# Patient Record
Sex: MALE
[De-identification: names, ages, dates, MRNs, and addresses within clinical notes are randomized per-mention and may not be internally consistent; named-entity substitution may affect disease eponyms.]

---

## 2018-01-25 ENCOUNTER — HOSPITAL ENCOUNTER (EMERGENCY)
Dept: HOSPITAL 14 - H.ER | Age: 43
Discharge: HOME | End: 2018-01-25
Payer: MEDICAID

## 2018-01-25 VITALS
RESPIRATION RATE: 17 BRPM | TEMPERATURE: 98.9 F | HEART RATE: 89 BPM | SYSTOLIC BLOOD PRESSURE: 122 MMHG | OXYGEN SATURATION: 99 % | DIASTOLIC BLOOD PRESSURE: 72 MMHG

## 2018-01-25 DIAGNOSIS — I10: ICD-10-CM
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
## 2018-01-25 DIAGNOSIS — J45.909: ICD-10-CM

## 2018-01-25 DIAGNOSIS — S43.401A: ICD-10-CM
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
## 2018-01-25 DIAGNOSIS — N43.3: ICD-10-CM

## 2018-01-25 DIAGNOSIS — C62.90: ICD-10-CM

## 2018-01-25 LAB
BASOPHILS # BLD AUTO: 0.1 K/UL (ref 0–0.2)
BASOPHILS NFR BLD: 0.9 % (ref 0–2)
BUN SERPL-MCNC: 16 MG/DL (ref 9–20)
CALCIUM SERPL-MCNC: 9.4 MG/DL (ref 8.4–10.2)
EOSINOPHIL # BLD AUTO: 0.1 K/UL (ref 0–0.7)
EOSINOPHIL NFR BLD: 1.7 % (ref 0–4)
ERYTHROCYTE [DISTWIDTH] IN BLOOD BY AUTOMATED COUNT: 15.2 % (ref 11.5–14.5)
GFR NON-AFRICAN AMERICAN: > 60
HGB BLD-MCNC: 11.9 G/DL (ref 12–18)
LYMPHOCYTES # BLD AUTO: 2 K/UL (ref 1–4.3)
LYMPHOCYTES NFR BLD AUTO: 28.7 % (ref 20–40)
MCH RBC QN AUTO: 28.6 PG (ref 27–31)
MCHC RBC AUTO-ENTMCNC: 32.7 G/DL (ref 33–37)
MCV RBC AUTO: 87.6 FL (ref 80–94)
MONOCYTES # BLD: 0.7 K/UL (ref 0–0.8)
MONOCYTES NFR BLD: 9.6 % (ref 0–10)
NEUTROPHILS # BLD: 4.1 K/UL (ref 1.8–7)
NEUTROPHILS NFR BLD AUTO: 59.1 % (ref 50–75)
NRBC BLD AUTO-RTO: 0.1 % (ref 0–0)
PLATELET # BLD: 233 K/UL (ref 130–400)
PMV BLD AUTO: 6.7 FL (ref 7.2–11.7)
RBC # BLD AUTO: 4.15 MIL/UL (ref 4.4–5.9)
WBC # BLD AUTO: 7 K/UL (ref 4.8–10.8)

## 2018-01-25 NOTE — CT
EXAM:

  CT Abdomen and Pelvis Without Intravenous Contrast



CLINICAL HISTORY:

  42 years old, male; Signs and symptoms; Mass, lump, or swelling; Other: 

Testicular mass; Additional info: Left testicular mass



TECHNIQUE:

  Axial computed tomography images of the abdomen and pelvis without 

intravenous contrast.  All CT scans at this facility use one or more dose 

reduction techniques, viz.: automated exposure control; ma/kV adjustment per 

patient size (including targeted exams where dose is matched to indication; 

i.e. head); or iterative reconstruction technique.  661 images are submitted.

  Coronal and sagittal reformatted images were created and reviewed.



COMPARISON:

  No relevant prior studies available.



FINDINGS:

  Artifacts:  Limited due to motion and misregistration artifacts.

  Lower thorax:  Bibasilar nonspecific infiltrates are present, consistent with 

atelectasis or pneumonia.  



 ABDOMEN:Limitations: Absence of IV contrast decreases sensitivity for 

detecting vascular and visceral injury and abnormality.

  Liver:  Unremarkable.

  Gallbladder and bile ducts:  Contracted gallbladder.

  Pancreas:  Unremarkable.  No ductal dilation.

  Spleen:  Unremarkable.  No splenomegaly.

  Adrenals:  Unremarkable.  No mass.

  Kidneys and ureters:  Unremarkable.  No obstructing stones.  No 

hydronephrosis.

  Stomach and bowel:  Large amount of stool in the colon.  Correlation with 

patient's clinical history of constipation  is recommended.  No mucosal 

thickening.

  Appendix:  Normal appendix.



 PELVIS:

  Bladder:  Unremarkable.

  Reproductive:  Possible hydrocele.  Prostate gland is seen.  The testicles 

are not well evaluated on this examination.



 ABDOMEN and PELVIS:

  Intraperitoneal space:  Unremarkable.  No free air.  No significant fluid 

collection.

  Bones/joints:  No acute fracture.  No dislocation.

  Soft tissues:  Unremarkable.

  Vasculature:  Unremarkable.  No abdominal aortic aneurysm.

  Lymph nodes: Unremarkable.



IMPRESSION:     

1.No acute abnormality on this  noncontrast CT examination of the abdomen and 

pelvis .

2.  Clinical history of left testicular mass.  Possible hydrocele.  The 

testicles are not evaluated on the CT scan.If clinically warranted, a testicle 

ultrasound may be helpful for further assessment.
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
## 2018-01-25 NOTE — RAD
PROCEDURE:  Radiographs of the Right Shoulder



HISTORY:

Posttraumatic right shoulder pain 







COMPARISON:

No prior.



FINDINGS:



BONES:

Normal. No fracture.



JOINTS:

Normal. Glenohumeral and acromioclavicular joints preserved. No 

osteoarthritis.



SOFT TISSUES:

Normal.



OTHER FINDINGS:

None.



IMPRESSION:

Normal radiographs of the right shoulder.



___________________________________________________________



Concordant results with the preliminary interpretation rendered by 

the emergency department physician

procedure.

## 2018-07-07 ENCOUNTER — HOSPITAL ENCOUNTER (EMERGENCY)
Dept: HOSPITAL 31 - C.ER | Age: 43
Discharge: LEFT BEFORE DISCHARGE | End: 2018-07-07
Payer: SELF-PAY

## 2018-07-07 VITALS
SYSTOLIC BLOOD PRESSURE: 126 MMHG | OXYGEN SATURATION: 97 % | HEART RATE: 103 BPM | TEMPERATURE: 97.6 F | DIASTOLIC BLOOD PRESSURE: 79 MMHG | RESPIRATION RATE: 20 BRPM

## 2018-07-07 DIAGNOSIS — F19.10: ICD-10-CM
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
## 2018-07-07 DIAGNOSIS — T50.991A: Primary | ICD-10-CM

## 2018-07-07 NOTE — C.PDOC
History Of Present Illness


42 year old male is brought to the ED by EMS for evaluation. Patient reports 

intermittent use of IV heroin, started using again this month. Patient reports 

he has been using heroin throughout his adult life. Patient admits to injecting 

" 2 bags of heroin: today. Patient was found by EMS unresponsive, diaphoretic 

and with urinary incontinence. Patient received 2 doses of narcan last one 

approximately 15 minutes PTA. Upon arrival patient is AOx3 voices no acute 

complaints. Patient agrees to be observed for 30 minutes until narcan has worn 

off but decline any type of blood work. 


Chief Complaint (Nursing): Substance Abuse


History Per: Patient, EMS


History/Exam Limitations: intoxication


Onset/Duration Of Symptoms: Hrs


Current Symptoms Are (Timing): Still Present


Suicide/Self Injury Attempted (Context): Ingestion


Modifying Factor(s): Other (Heroin)


Associated Symptoms: denies: Depression, Suicidal Thoughts, Suicidal Plan


Recent travel outside of the [place]: No


Additional History Per: Patient, EMS





Past Medical History


Reviewed: Historical Data, Nursing Documentation, Vital Signs


Vital Signs: 


 Last Vital Signs











Temp  97.6 F   18 21:18


 


Pulse  103 H  18 21:18


 


Resp  20   18 21:18


 


BP  126/79   18 21:18


 


Pulse Ox  97   18 22:09














- Medical History


PMH: Asthma, HTN


   Denies: Diabetes, Hepatitis, HIV, Seizures, Sexually Transmitted Disease


Surgical History: No Surg Hx


Family History: States: Unknown Family Hx





- Social History


Hx Alcohol Use: Yes


Hx Substance Use: Yes (heroine)





- Immunization History


Hx Tetanus Toxoid Vaccination: Yes


Hx Influenza Vaccination: Yes


Hx Pneumococcal Vaccination: Yes





Review Of Systems


Constitutional: Negative for: Fever, Chills


Cardiovascular: Negative for: Chest Pain, Palpitations


Respiratory: Negative for: Cough, Shortness of Breath


Gastrointestinal: Negative for: Nausea, Vomiting


Skin: Negative for: Rash


Psych: Negative for: Depression, Suicidal ideation





Physical Exam





- Physical Exam


Appears: Non-toxic, Other (poor hygiene, urine soaked pants. defensive, 

argumentative)


Skin: Normal Color, Warm, Dry, Diaphoretic, Other (fresh needle tracks dorsum 

on right hand)


Head: Atraumatic, Normacephalic


Eye(s): bilateral: Normal Inspection


Oral Mucosa: Moist


Neck: Normal ROM, Supple


Chest: Symmetrical


Cardiovascular: Rhythm Regular


Respiratory: Normal Breath Sounds, No Rales, No Rhonchi, No Wheezing


Gastrointestinal/Abdominal: Soft, No Tenderness, No Guarding, No Rebound


Extremity: Normal ROM, No Tenderness, No Swelling


Neurological/Psych: Oriented x3, Normal Speech, Other (non focal)


Gait: Steady





ED Course And Treatment


ECG: Interpreted By Me, Viewed By Me


ECG Rhythm: Sinus Rhythm


Interpretation Of ECG: Left atrial enlargement, right bundeloid apparent in QRS


Rate From EC (BPM)


O2 Sat by Pulse Oximetry: 97 (ON RA)


Pulse Ox Interpretation: Normal





Against Medical Advice





- AMA


Patient Left Against Medical Advice: 


The patient declines admission to the hospital and wishes to leave the 

Emergency Department.  This action is against my medical advice. This decision 

was made with informed refusal. The patient was told that admission to the 

hospital is necessary.  Explanation of the reasons why were discussed.  


The risks of leaving were explained to the patient and include, but are not 

limited to, worsening of known or currently unknown conditions, permanent 

disability and death from undiagnosed or untreated conditions. 


The patient has the capacity to make this informed decision and understands my 

explanation of the current medical problem and risks of leaving. 


The patient voluntarily accepts these risks and signed an AMA form documenting 

our conversation.


The patient was given the opportunity to ask questions and reconsider.  The 

patient was encouraged to return to the Emergency Department at any time for 

further care.








Medical Decision Making


Medical Decision Making: 


Patient agrees to be observed for 30 minutes until narcan wear off but declines 

any type of blood work. 





22:08 - Patient was observed for 40 minutes did not go back to narcotic states, 

will be allowed to sign out AMA. 





Disposition





- Disposition


Referrals: 


[facility] [Outside]


Disposition: AGAINST MEDICAL ADVICE


Disposition Time: 22:20


Condition: FAIR


Instructions:  Narcotic Overdose 


Forms:  [facility] (English)


Print Language: ENGLISH





- Clinical Impression


Clinical Impression: 


 Drug abuse, Accidental drug overdose








- Scribe Statement


The provider has reviewed the documentation as recorded by the Scribe


Alex Alatorre





All medical record entries made by the Scribe were at my direction and 

personally dictated by me. I have reviewed the chart and agree that the record 

accurately reflects my personal performance of the history, physical exam, 

medical decision making, and the department course for this patient. I have 

also personally directed, reviewed, and agree with the discharge instructions 

and disposition.

## 2018-07-21 ENCOUNTER — HOSPITAL ENCOUNTER (EMERGENCY)
Dept: HOSPITAL 31 - C.ER | Age: 43
Discharge: LEFT BEFORE DISCHARGE | End: 2018-07-21
Payer: SELF-PAY

## 2018-07-21 VITALS
HEART RATE: 85 BPM | RESPIRATION RATE: 22 BRPM | OXYGEN SATURATION: 97 % | SYSTOLIC BLOOD PRESSURE: 145 MMHG | DIASTOLIC BLOOD PRESSURE: 90 MMHG | TEMPERATURE: 98.2 F

## 2018-07-21 DIAGNOSIS — R11.2: Primary | ICD-10-CM

## 2018-07-21 NOTE — C.PDOC
History Of Present Illness


42 year old male presents to the ED for evaluation of nausea and vomiting which 

began one hour ago, after he ate rice and beans. Patient also complains of a 

sore throat. He denies fever, chills, throat swelling, shortness of breath. 


Chief Complaint (Nursing): Allergic Reaction


History Per: Patient


History/Exam Limitations: no limitations


Onset/Duration Of Symptoms: Hrs


Current Symptoms Are (Timing): Still Present


Associated Symptoms: Nausea, Vomiting.  denies: Fever, Chills


Exacerbating Factors: Food


Additional History Per: Patient





Past Medical History


Reviewed: Historical Data, Nursing Documentation, Vital Signs


Vital Signs: 


 Last Vital Signs











Temp  98.2 F   07/21/18 01:04


 


Pulse  85   07/21/18 01:04


 


Resp  22   07/21/18 01:04


 


BP  145/90   07/21/18 01:04


 


Pulse Ox  97   07/21/18 01:27














- Medical History


PMH: Asthma, HTN


   Denies: Diabetes, Hepatitis, HIV, Seizures, Sexually Transmitted Disease


Family History: States: Unknown Family Hx





- Social History


Hx Alcohol Use: Yes


Hx Substance Use: Yes (heroine)





- Immunization History


Hx Tetanus Toxoid Vaccination: Yes


Hx Influenza Vaccination: Yes


Hx Pneumococcal Vaccination: Yes





Review Of Systems


Constitutional: Negative for: Fever, Chills


ENT: Positive for: Throat Pain.  Negative for: Throat Swelling


Respiratory: Negative for: Shortness of Breath


Gastrointestinal: Positive for: Nausea, Vomiting





Physical Exam





- Physical Exam


Appears: Non-toxic, No Acute Distress


Skin: Normal Color, Warm, Dry


Head: Atraumatic, Normacephalic


Eye(s): bilateral: Normal Inspection


Oral Mucosa: Moist


Tongue: Normal Appearing, No Swelling


Lips: Normal Appearing, No Swelling


Throat: Normal, No Erythema, No Exudate, No Other (swelling )


Neck: Supple


Chest: Symmetrical, No Deformity, No Tenderness


Cardiovascular: Rhythm Regular


Respiratory: Normal Breath Sounds, No Rales, No Rhonchi, No Wheezing


Gastrointestinal/Abdominal: Soft, No Tenderness, No Guarding, No Rebound


Extremity: Normal ROM, Capillary Refill (less than 2 seconds )


Neurological/Psych: Oriented x3, Normal Speech, Normal Cognition





ED Course And Treatment


O2 Sat by Pulse Oximetry: 97 (on RA)


Pulse Ox Interpretation: Normal


Progress Note: Bloodwork ordered.  Patient states he does not want any labwork 

done. Patient states he is feeling better and is asking to be discharged home.





Disposition





- Disposition


Disposition: ELOPEMENT - ER ONLY


Disposition Time: 01:35


Condition: STABLE


Forms:  [facility] (English)





- Clinical Impression


Clinical Impression: 


 Nausea & vomiting








- Scribe Statement


The provider has reviewed the documentation as recorded by the Scribe (Emma Cortez)


Provider Attestation: 








All medical record entries made by the Scribe were at my direction and 

personally dictated by me. I have reviewed the chart and agree that the record 

accurately reflects my personal performance of the history, physical exam, 

medical decision making, and the department course for this patient. I have 

also personally directed, reviewed, and agree with the discharge instructions 

and disposition.